# Patient Record
Sex: FEMALE | Race: WHITE | NOT HISPANIC OR LATINO | URBAN - METROPOLITAN AREA
[De-identification: names, ages, dates, MRNs, and addresses within clinical notes are randomized per-mention and may not be internally consistent; named-entity substitution may affect disease eponyms.]

---

## 2024-05-02 ENCOUNTER — TELEPHONE (OUTPATIENT)
Dept: SURGERY | Facility: CLINIC | Age: 64
End: 2024-05-02

## 2024-05-09 ENCOUNTER — TELEPHONE (OUTPATIENT)
Dept: SURGERY | Facility: CLINIC | Age: 64
End: 2024-05-09

## 2025-01-16 ENCOUNTER — APPOINTMENT (OUTPATIENT)
Dept: RADIOLOGY | Facility: HOSPITAL | Age: 65
End: 2025-01-16
Payer: COMMERCIAL

## 2025-01-16 ENCOUNTER — APPOINTMENT (OUTPATIENT)
Dept: ORTHOPEDIC SURGERY | Facility: HOSPITAL | Age: 65
End: 2025-01-16
Payer: COMMERCIAL

## 2025-01-16 DIAGNOSIS — M25.562 LEFT KNEE PAIN, UNSPECIFIED CHRONICITY: ICD-10-CM

## 2025-02-18 ENCOUNTER — APPOINTMENT (OUTPATIENT)
Dept: ORTHOPEDIC SURGERY | Facility: CLINIC | Age: 65
End: 2025-02-18
Payer: COMMERCIAL

## 2025-03-04 ENCOUNTER — APPOINTMENT (OUTPATIENT)
Dept: ORTHOPEDIC SURGERY | Facility: CLINIC | Age: 65
End: 2025-03-04
Payer: COMMERCIAL

## 2025-03-04 ENCOUNTER — HOSPITAL ENCOUNTER (OUTPATIENT)
Dept: RADIOLOGY | Facility: CLINIC | Age: 65
Discharge: HOME | End: 2025-03-04
Payer: COMMERCIAL

## 2025-03-04 DIAGNOSIS — M25.562 LEFT KNEE PAIN, UNSPECIFIED CHRONICITY: ICD-10-CM

## 2025-03-04 DIAGNOSIS — M17.12 PRIMARY OSTEOARTHRITIS OF LEFT KNEE: Primary | ICD-10-CM

## 2025-03-04 PROCEDURE — 73564 X-RAY EXAM KNEE 4 OR MORE: CPT | Mod: LT

## 2025-03-04 PROCEDURE — 73564 X-RAY EXAM KNEE 4 OR MORE: CPT | Mod: LEFT SIDE | Performed by: RADIOLOGY

## 2025-03-04 PROCEDURE — 99204 OFFICE O/P NEW MOD 45 MIN: CPT | Performed by: FAMILY MEDICINE

## 2025-03-04 NOTE — PROGRESS NOTES
** Please excuse any errors in grammar or translation related to this dictation. Voice recognition software was utilized to prepare this document. **    Assessment & Plan:   Clinical presentation is most consistent with left advanced patellofemoral and moderate medial compartment knee arthritis.    Discuss with patient the nature of this disease and how it can be progressive.  Discuss how symptoms can wax and wane in severity. Management options reviewed below.  Patient provided handout that reviews this condition and available treatments.   - Maintaining a healthy weight: Every pound of bodyweight is about 4-5 pounds through the lower extremity.   - Exercise program to improve quad & hamstring strength to support joint. Provided home exercise handout for knee strengthening  - Activity modifications as needed to include use of cane/walker or braces.  - Analgesics to include but not limited to Tylenol up to 3g daily; topical diclofenac gel (Voltaren) up to 4x/daily. Encourage optimization of tylenol and diclofenac gel.   - Steroid injection.  - Hyaluronic acid injection.  - PRP injection.  - Referral to arthroplastic surgeon for potential joint replacement.   After considering treatment options, patient elects to have home exercise handout for knee strengthening and encourage optimization of tylenol and diclofenac gel.  Follow-up as needed for ongoing management. All questions answered and patient is agreeable to this plan. Consider formal physical therapy versus corticosteroid injection.    Chief complaint:  Left knee pain    HPI:  63 y/o female work sedentary job at [employer], hx of remote cervical spine surgery, presents with left knee pain.  This complaint has been ongoing for 5+ years.  No mechanism of injury reported at onset. Symptoms have progressively worsened with time.  Pain is most prominent at lateral knee though it is also present at anterior knee.  Swelling is intermittent.  It is  "associated with giving out, sensation of stiffness and crackling/popping sensation. No reported sensory changes or weakness, joint locking, or feeling of instability.  Symptoms are aggravated by walking, sitting for extended periods, stairs, bending, and wake her up at night.  Lately it has been waking her up at night.  Treatment to date includes heat, ibuprofen, knee brace, topical creams, corticosteroid injection which lasted for 1 week. Denies previous formal physical therapy and surgery to this site.     There is no problem list on file for this patient.    No past surgical history on file.  No current outpatient medications on file prior to visit.     No current facility-administered medications on file prior to visit.     Exam:  General Exam:  Constitutional - NAD, AAO x 3, conversing appropriately.  HEENT- Normocephalic and atraumatic. No facial deformities. Hearing grossly normal.  Lungs - Breathing non-labored with normal rate. No accessory muscle use.  CV - Extremities warm and well-perfused, brisk capillary refill present.   Neuro - CN II-XII grossly intact.    Left Knee examined. Trace effusion without ecchymosis, warmth or erythema.  AROM from 0 to 110 deg with 5/5 strength. SILT overlying knee. Retropatellar crepitus present. Mild tenderness over lateral joint line. Minimal tenderness over medial joint line. + patella grind +patella compression. No popliteal mass palpated. Valgus laxity but no pain at 0 and 30 degrees. No laxity to varus stress at 0 or 30 deg.  Negative Hardeep.     Results:  Xrays of left knee obtained 3/4/2025 personally interpreted as advanced patellofemoral compartment and moderate medial compartment degenerative changes with joint space narrowing, osteophyte formation, and subchondral sclerosis.     No results found for: \"HGBA1C\", \"CREATININE\", \"EGFR\"    Procedure: none    "